# Patient Record
Sex: MALE | Race: WHITE | NOT HISPANIC OR LATINO | Employment: FULL TIME | RURAL
[De-identification: names, ages, dates, MRNs, and addresses within clinical notes are randomized per-mention and may not be internally consistent; named-entity substitution may affect disease eponyms.]

---

## 2020-02-14 ENCOUNTER — OFFICE VISIT (OUTPATIENT)
Dept: FAMILY MEDICINE CLINIC | Facility: CLINIC | Age: 25
End: 2020-02-14

## 2020-02-14 VITALS
DIASTOLIC BLOOD PRESSURE: 78 MMHG | HEIGHT: 69 IN | BODY MASS INDEX: 24.68 KG/M2 | TEMPERATURE: 99.5 F | HEART RATE: 105 BPM | WEIGHT: 166.6 LBS | SYSTOLIC BLOOD PRESSURE: 138 MMHG | RESPIRATION RATE: 18 BRPM | OXYGEN SATURATION: 98 %

## 2020-02-14 DIAGNOSIS — J01.00 ACUTE NON-RECURRENT MAXILLARY SINUSITIS: Primary | ICD-10-CM

## 2020-02-14 DIAGNOSIS — F17.200 TOBACCO USE DISORDER: ICD-10-CM

## 2020-02-14 PROBLEM — J30.9 ALLERGIC RHINITIS: Status: ACTIVE | Noted: 2020-02-14

## 2020-02-14 PROCEDURE — 99213 OFFICE O/P EST LOW 20 MIN: CPT | Performed by: FAMILY MEDICINE

## 2020-02-14 RX ORDER — AMOXICILLIN 500 MG/1
1000 TABLET, FILM COATED ORAL 3 TIMES DAILY
Qty: 60 TABLET | Refills: 0 | Status: SHIPPED | OUTPATIENT
Start: 2020-02-14 | End: 2020-09-04

## 2020-02-14 NOTE — PROGRESS NOTES
Jayjay   Hermilo Choi is a 24 y.o. male.     Chief Complaint   Patient presents with   • URI       URI    This is a new problem. The current episode started in the past 7 days. The problem has been gradually worsening. There has been no fever. Associated symptoms include congestion, coughing, ear pain, rhinorrhea and a sore throat. Pertinent negatives include no abdominal pain, chest pain, diarrhea, headaches, nausea, rash, sinus pain, vomiting or wheezing. He has tried acetaminophen and decongestant for the symptoms. The treatment provided no relief.        The following portions of the patient's history were reviewed and updated as appropriate: allergies, current medications, past family history, past medical history, past social history, past surgical history and problem list.    Allergies:  No Known Allergies    Social History:  Social History     Socioeconomic History   • Marital status: Single     Spouse name: Not on file   • Number of children: Not on file   • Years of education: Not on file   • Highest education level: Not on file   Tobacco Use   • Smoking status: Current Every Day Smoker     Years: 8.00     Types: Cigars     Start date: 2012   • Smokeless tobacco: Current User     Types: Chew   Substance and Sexual Activity   • Alcohol use: Yes     Frequency: 2-3 times a week     Drinks per session: 5 or 6     Binge frequency: Less than monthly     Comment: Occasional alcohol use, Social   • Drug use: Not Currently   • Sexual activity: Defer       Family History:  Family History   Problem Relation Age of Onset   • Thyroid disease Sister    • Heart disease Maternal Grandfather         Cardiocvascular       Past Medical History :  Patient Active Problem List   Diagnosis   • Allergic rhinitis   • Tobacco use disorder       Medication List:  Outpatient Encounter Medications as of 2/14/2020   Medication Sig Dispense Refill   • amoxicillin (AMOXIL) 500 MG tablet Take 2 tablets by mouth 3 (Three) Times a Day.  "60 tablet 0     No facility-administered encounter medications on file as of 2/14/2020.        Past Surgical History:  Past Surgical History:   Procedure Laterality Date   • TONSILLECTOMY AND ADENOIDECTOMY  2001   • TYMPANOSTOMY TUBE PLACEMENT  2003       Review of Systems:  Review of Systems   Constitutional: Negative for fever. Fatigue: no feeling well.   HENT: Positive for congestion, ear pain, rhinorrhea and sore throat.    Respiratory: Positive for cough. Negative for shortness of breath and wheezing.    Cardiovascular: Negative for chest pain.   Gastrointestinal: Negative for abdominal pain, diarrhea, nausea and vomiting.   Endocrine: Negative for cold intolerance, heat intolerance, polydipsia and polyuria.   Musculoskeletal: Negative for arthralgias and myalgias.   Skin: Negative for rash.   Neurological: Negative for weakness, numbness and headache.   Hematological: Negative for adenopathy. Does not bruise/bleed easily.       I have reviewed and confirmed the accuracy of the ROS as documented by the MA/LPN/RN Louise Ervin MD    Vital Signs:  Visit Vitals  /78 (BP Location: Left arm, Patient Position: Sitting, Cuff Size: Adult)   Pulse 105   Temp 99.5 °F (37.5 °C) (Oral)   Resp 18   Ht 175.3 cm (69\")   Wt 75.6 kg (166 lb 9.6 oz)   SpO2 98% Comment: Room air   BMI 24.60 kg/m²       Physical Exam   Constitutional: He is oriented to person, place, and time. He appears well-developed and well-nourished. No distress.   HENT:   Right Ear: Tympanic membrane and external ear normal.   Left Ear: Tympanic membrane and external ear normal.   Nose: Right sinus exhibits maxillary sinus tenderness.   Mouth/Throat: No oropharyngeal exudate (moderate post nasal secretions. ).   Eyes: Conjunctivae are normal.   Neck: Neck supple. No thyromegaly present.   Cardiovascular: Normal rate, regular rhythm, normal heart sounds and intact distal pulses. Exam reveals no gallop and no friction rub.   No murmur " heard.  Pulmonary/Chest: Effort normal and breath sounds normal. No respiratory distress. He has no wheezes. He has no rales.   Musculoskeletal: He exhibits no edema.   Lymphadenopathy:     He has no cervical adenopathy.   Neurological: He is alert and oriented to person, place, and time. Coordination normal.   Skin: Skin is warm. No rash noted. No erythema.       Assessment and Plan:  Problem List Items Addressed This Visit        Unprioritized    Tobacco use disorder    Overview     Cessation strongly encouraged techniques to stop were discussed. He is presently not interested.            Other Visit Diagnoses     Acute non-recurrent maxillary sinusitis    -  Primary    Fluids humidity, saline nose drops, antibiotics and follow up should sxs not improve over 48 hrs and resolve in1 week    Relevant Medications    amoxicillin (AMOXIL) 500 MG tablet          An After Visit Summary and PPPS were given to the patient.

## 2020-04-22 ENCOUNTER — TELEPHONE (OUTPATIENT)
Dept: FAMILY MEDICINE CLINIC | Facility: CLINIC | Age: 25
End: 2020-04-22

## 2020-04-22 NOTE — TELEPHONE ENCOUNTER
Called Vashti , Atom needs to be evaluated, can do video, phone or office visit,office visit was arranged per her request.

## 2020-04-22 NOTE — TELEPHONE ENCOUNTER
Pt's mom Vashti called and said he needs his poison ivy cream and a steroid pack. Pharmacy is [facility] in [place]. She can be reached at [phone]

## 2020-04-23 ENCOUNTER — TELEPHONE (OUTPATIENT)
Dept: FAMILY MEDICINE CLINIC | Facility: CLINIC | Age: 25
End: 2020-04-23

## 2020-09-04 ENCOUNTER — OFFICE VISIT (OUTPATIENT)
Dept: FAMILY MEDICINE CLINIC | Facility: CLINIC | Age: 25
End: 2020-09-04

## 2020-09-04 VITALS
TEMPERATURE: 99.8 F | HEART RATE: 95 BPM | HEIGHT: 69 IN | OXYGEN SATURATION: 98 % | RESPIRATION RATE: 18 BRPM | WEIGHT: 158.8 LBS | DIASTOLIC BLOOD PRESSURE: 70 MMHG | SYSTOLIC BLOOD PRESSURE: 128 MMHG | BODY MASS INDEX: 23.52 KG/M2

## 2020-09-04 DIAGNOSIS — R11.2 NAUSEA AND VOMITING, INTRACTABILITY OF VOMITING NOT SPECIFIED, UNSPECIFIED VOMITING TYPE: Primary | ICD-10-CM

## 2020-09-04 PROCEDURE — 99212 OFFICE O/P EST SF 10 MIN: CPT | Performed by: FAMILY MEDICINE

## 2020-09-04 NOTE — PROGRESS NOTES
Subjective   Hermilo Choi is a 25 y.o. male.     Chief Complaint   Patient presents with   • Nausea       Nausea   This is a new problem. The current episode started yesterday. The problem has been resolved. Associated symptoms include fatigue ( no feeling well. ), nausea and vertigo. Pertinent negatives include no abdominal pain, arthralgias, chest pain, chills, congestion, coughing, fever, headaches, myalgias, numbness, rash, sore throat, vomiting or weakness. Nothing aggravates the symptoms. He has tried nothing for the symptoms. The treatment provided significant relief.        The following portions of the patient's history were reviewed and updated as appropriate: allergies, current medications, past family history, past medical history, past social history, past surgical history and problem list.    Allergies:  No Known Allergies    Social History:  Social History     Socioeconomic History   • Marital status: Single     Spouse name: Not on file   • Number of children: Not on file   • Years of education: Not on file   • Highest education level: Not on file   Tobacco Use   • Smoking status: Current Every Day Smoker     Packs/day: 1.00     Years: 8.00     Pack years: 8.00     Types: Cigars     Start date: 2012   • Smokeless tobacco: Current User     Types: Chew   Substance and Sexual Activity   • Alcohol use: Yes     Frequency: 2-4 times a month     Drinks per session: 1 or 2     Binge frequency: Monthly     Comment: Occasional alcohol use, Social   • Drug use: Not Currently   • Sexual activity: Defer       Family History:  Family History   Problem Relation Age of Onset   • Thyroid disease Sister    • Heart disease Maternal Grandfather        Past Medical History :  Patient Active Problem List   Diagnosis   • Allergic rhinitis   • Tobacco use disorder       Medication List:  Outpatient Encounter Medications as of 9/4/2020   Medication Sig Dispense Refill   • [DISCONTINUED] amoxicillin (AMOXIL) 500 MG tablet Take  "2 tablets by mouth 3 (Three) Times a Day. 60 tablet 0     No facility-administered encounter medications on file as of 9/4/2020.        Past Surgical History:  Past Surgical History:   Procedure Laterality Date   • TONSILLECTOMY AND ADENOIDECTOMY  2001   • TYMPANOSTOMY TUBE PLACEMENT  2003       Review of Systems:  Review of Systems   Constitutional: Positive for fatigue ( no feeling well. ). Negative for chills and fever.   HENT: Negative for congestion and sore throat.    Respiratory: Negative for cough.    Cardiovascular: Negative for chest pain.   Gastrointestinal: Positive for nausea. Negative for abdominal pain and vomiting.   Endocrine: Negative for cold intolerance, heat intolerance, polydipsia and polyuria.   Genitourinary: Negative for dysuria and urgency.   Musculoskeletal: Negative for arthralgias and myalgias.   Skin: Negative for rash.   Neurological: Positive for vertigo. Negative for weakness, numbness and headache.       I have reviewed and confirmed the accuracy of the HPI and ROS as documented by the MA/LPN/RN Louise Ervin MD    Vital Signs:  Visit Vitals  /70 (BP Location: Left arm, Patient Position: Sitting, Cuff Size: Adult)   Pulse 95   Temp 99.8 °F (37.7 °C)   Resp 18   Ht 175.3 cm (69\")   Wt 72 kg (158 lb 12.8 oz)   SpO2 98% Comment: room air   BMI 23.45 kg/m²       Physical Exam   Constitutional: He is oriented to person, place, and time. He appears well-developed and well-nourished. No distress.   HENT:   Right Ear: Tympanic membrane and external ear normal.   Left Ear: Tympanic membrane and external ear normal.   Mouth/Throat: Oropharynx is clear and moist.   Eyes: Conjunctivae are normal.   Neck: Neck supple. No JVD present. No thyromegaly present.   Cardiovascular: Normal rate, regular rhythm and normal heart sounds. Exam reveals no gallop and no friction rub.   No murmur heard.  Pulmonary/Chest: Effort normal and breath sounds normal. No respiratory distress. He has no " wheezes. He has no rales.   Abdominal: Soft. Bowel sounds are normal. He exhibits no distension and no mass. There is no abdominal tenderness.   Lymphadenopathy:     He has no cervical adenopathy.   Neurological: He is alert and oriented to person, place, and time. He displays normal reflexes. Coordination normal.   Skin: Skin is warm. No rash noted. No erythema.   Vitals reviewed.      Assessment and Plan:  Problem List Items Addressed This Visit     None      Visit Diagnoses     Nausea and vomiting, intractability of vomiting not specified, unspecified vomiting type    -  Primary    Etiology uncertain, possibly heat related. Note for work. He will push fluids rest and notify should sxs continue lab and further workup.           An After Visit Summary and PPPS were given to the patient.

## 2021-09-21 ENCOUNTER — TELEPHONE (OUTPATIENT)
Dept: FAMILY MEDICINE CLINIC | Facility: CLINIC | Age: 26
End: 2021-09-21

## 2022-10-28 ENCOUNTER — OFFICE VISIT (OUTPATIENT)
Dept: FAMILY MEDICINE CLINIC | Facility: CLINIC | Age: 27
End: 2022-10-28

## 2022-10-28 VITALS
DIASTOLIC BLOOD PRESSURE: 86 MMHG | OXYGEN SATURATION: 98 % | TEMPERATURE: 98.1 F | HEART RATE: 80 BPM | RESPIRATION RATE: 18 BRPM | WEIGHT: 168.8 LBS | HEIGHT: 70 IN | SYSTOLIC BLOOD PRESSURE: 120 MMHG | BODY MASS INDEX: 24.17 KG/M2

## 2022-10-28 DIAGNOSIS — F17.200 TOBACCO USE DISORDER: ICD-10-CM

## 2022-10-28 DIAGNOSIS — F41.9 ANXIETY: Primary | ICD-10-CM

## 2022-10-28 PROCEDURE — 99213 OFFICE O/P EST LOW 20 MIN: CPT | Performed by: FAMILY MEDICINE

## 2022-10-28 RX ORDER — ESCITALOPRAM OXALATE 10 MG/1
10 TABLET ORAL DAILY
Qty: 90 TABLET | Refills: 3 | Status: SHIPPED | OUTPATIENT
Start: 2022-10-28

## 2022-10-28 NOTE — PROGRESS NOTES
"Chief Complaint  Anxiety    Subjective     CC  Problem List  Visit Diagnosis   Encounters  Notes  Medications  Labs  Result Review Imaging  Media    Hermilo Choi presents to [facility] for   Anxiety  Presents for initial visit. Onset was 1 to 5 years ago. The problem has been gradually worsening. Symptoms include decreased concentration, irritability, nervous/anxious behavior, panic and restlessness. Patient reports no chest pain, depressed mood, excessive worry, nausea, palpitations, shortness of breath or suicidal ideas. The severity of symptoms is moderate. Exacerbated by: financial stress. The quality of sleep is fair. Nighttime awakenings: one to two.           Review of Systems   Constitutional: Positive for irritability.   Respiratory: Negative for shortness of breath.    Cardiovascular: Negative for chest pain and palpitations.   Gastrointestinal: Negative for abdominal pain, constipation, diarrhea and nausea.   Psychiatric/Behavioral: Positive for decreased concentration. Negative for suicidal ideas and depressed mood. The patient is nervous/anxious.         Objective   Vital Signs:   /86 (BP Location: Right arm, Patient Position: Sitting, Cuff Size: Adult)   Pulse 80   Temp 98.1 °F (36.7 °C) (Temporal)   Resp 18   Ht 177.8 cm (70\")   Wt 76.6 kg (168 lb 12.8 oz)   SpO2 98% Comment: Room air  BMI 24.22 kg/m²     Physical Exam  Constitutional:       General: He is not in acute distress.  Cardiovascular:      Rate and Rhythm: Normal rate.      Heart sounds: No murmur heard.  Pulmonary:      Effort: Pulmonary effort is normal.      Breath sounds: Normal breath sounds. No wheezing.   Musculoskeletal:      Cervical back: Neck supple.   Lymphadenopathy:      Cervical: No cervical adenopathy.   Neurological:      Mental Status: He is alert.        Result Review :Labs  Result Review  Imaging  Med Tab  Media                 Assessment and Plan CC Problem " List  Visit Diagnosis  ROS  Review (Popup)  Health Maintenance  Quality  BestPractice  Medications  SmartSets  SnapShot Encounters  Media  Problem List Items Addressed This Visit        Unprioritized    Tobacco use disorder    Overview     Cessation strongly encouraged techniques to stop were discussed. He is presently not interested         Anxiety - Primary    Overview     Moderate begin meds and follow up in 1 mo.  Good support network.   F/u in 1 mo prn.          Relevant Medications    escitalopram (LEXAPRO) 10 MG tablet       Follow Up Instructions Charge Capture  Follow-up Communications  Return in about 4 weeks (around 11/25/2022).  Patient was given instructions and counseling regarding his condition or for health maintenance advice. Please see specific information pulled into the AVS if appropriate.

## 2023-07-07 PROBLEM — E66.3 OVERWEIGHT (BMI 25.0-29.9): Status: ACTIVE | Noted: 2023-07-07

## 2023-07-07 PROBLEM — Z00.00 ENCOUNTER FOR ANNUAL PHYSICAL EXAM: Status: ACTIVE | Noted: 2023-07-07

## 2023-08-15 ENCOUNTER — TELEPHONE (OUTPATIENT)
Dept: FAMILY MEDICINE CLINIC | Facility: CLINIC | Age: 28
End: 2023-08-15
Payer: COMMERCIAL

## 2023-08-15 ENCOUNTER — OFFICE VISIT (OUTPATIENT)
Dept: FAMILY MEDICINE CLINIC | Facility: CLINIC | Age: 28
End: 2023-08-15
Payer: COMMERCIAL

## 2023-08-15 VITALS
OXYGEN SATURATION: 98 % | RESPIRATION RATE: 16 BRPM | SYSTOLIC BLOOD PRESSURE: 114 MMHG | HEART RATE: 75 BPM | WEIGHT: 188.4 LBS | TEMPERATURE: 97.8 F | HEIGHT: 70 IN | DIASTOLIC BLOOD PRESSURE: 60 MMHG | BODY MASS INDEX: 26.97 KG/M2

## 2023-08-15 DIAGNOSIS — E66.3 OVERWEIGHT (BMI 25.0-29.9): ICD-10-CM

## 2023-08-15 DIAGNOSIS — K64.4 EXTERNAL HEMORRHOID: ICD-10-CM

## 2023-08-15 DIAGNOSIS — K92.2 LOWER GI BLEED: Primary | ICD-10-CM

## 2023-08-15 PROCEDURE — 99213 OFFICE O/P EST LOW 20 MIN: CPT | Performed by: STUDENT IN AN ORGANIZED HEALTH CARE EDUCATION/TRAINING PROGRAM

## 2023-08-15 RX ORDER — HYDROCORTISONE ACETATE 25 MG/1
25 SUPPOSITORY RECTAL 2 TIMES DAILY
Qty: 14 SUPPOSITORY | Refills: 0 | Status: SHIPPED | OUTPATIENT
Start: 2023-08-15 | End: 2023-08-22

## 2023-08-15 NOTE — PROGRESS NOTES
Subjective   Atom EFRA Choi is a 28 y.o. male.   Chief Complaint   Patient presents with    Rectal Bleeding       History of Present Illness       Bright red blood per rectum  - Started 2 years ago-waxing and waning.  Worsened 08/10/2023  - Bright red blood in stool, water, and toilet paper. Some pain on anus  - LUQ pain (dull ache swelling sensation) occasionally  - Pt states he has been light headed, dizzy  - Pt has never had a colonoscopy  -  When he actually has a bowel movement, there is no blood, later he will sit down thinking that he has to have a bowel movement and there will be no stool but only blood  - has bm 2-3x a day, states they are smooth and formed.  States when he has had constipation it was only once in the past  -Patient states that he will sit on the toilet and browse his phone for 10 minutes every time he goes to the restroom  -Patient brought photo on his phone of his toilet.  Toilet bowl is bright red with blood and flecks of blood are sprayed higher inside the toilet bowl almost all the way up to the seat    The following portions of the patient's history were reviewed and updated as appropriate: allergies, current medications, past family history, past medical history, past social history, past surgical history, and problem list.    Patient Active Problem List   Diagnosis    Allergic rhinitis    Tobacco use disorder    Anxiety    Encounter for annual physical exam    Overweight (BMI 25.0-29.9)       Current Outpatient Medications on File Prior to Visit   Medication Sig Dispense Refill    escitalopram (LEXAPRO) 10 MG tablet Take 1 tablet by mouth Daily. 90 tablet 3     No current facility-administered medications on file prior to visit.     Current outpatient and discharge medications have been reconciled for the patient.  Reviewed by: Delaney Fall DO      No Known Allergies      Objective   Visit Vitals  /60 (BP Location: Right arm, Patient Position: Sitting, Cuff Size: Adult)  "  Pulse 75   Temp 97.8 øF (36.6 øC) (Skin)   Resp 16   Ht 177.8 cm (70\")   Wt 85.5 kg (188 lb 6.4 oz)   SpO2 98%   BMI 27.03 kg/mý       Physical Exam  HENT:      Head: Normocephalic and atraumatic.   Eyes:      Conjunctiva/sclera: Conjunctivae normal.   Genitourinary:     Comments: - No anal sphincter tears apparent via exam  -No external hemorrhoids on anus or surrounding anus  -Attempted to look via anoscope but limited due to patient's discomfort.  Unable to completely visualize hemorrhoid but able to see abnormal mucosa discoloration on patient's 7 to 8 o'clock position on the anus  Neurological:      General: No focal deficit present.      Mental Status: He is alert and oriented to person, place, and time.   Psychiatric:         Mood and Affect: Mood normal.         Behavior: Behavior normal.         Diagnoses and all orders for this visit:    1. Lower GI bleed (Primary)/external hemorrhoid  -Discussed with patient that due to his symptoms, I would suspect he may have an external hemorrhoid (especially since patient did not have an anal tear on physical exam) considering this is very painful for him, he is getting a lot of bleeding and distance of exquisite pain for patient was about half an inch inside the anus via anoscope     - CBC & Differential, melony profile, ferritin to evaluate for anemia due to bleeding and symptoms of dizziness  -  hydrocortisone (ANUSOL-HC) 25 MG suppository; Insert 1 suppository into the rectum 2 (Two) Times a Day for 7 days.  Dispense: 14 suppository; Refill: 0 to help shrink and give some relief of hemorrhoids  -Gave patient some information by after visit summary on constipation hemorrhoids though patient does not sound like he is constipated and the hemorrhoid is not external enough to where he may be able to apply cream easily.  Did tell patient to not sit on the toilet for long periods of time as that can cause increased pressure in that area  -     Ambulatory Referral to " Gastroenterology: [facility] for further investigation if the above does not resolve his issue      3. Overweight (BMI 25.0-29.9)  Assessment & Plan:  Patient's (Body mass index is 27.03 kg/mý.) indicates that they are overweight with health conditions that include none . Weight is unchanged. BMI is is above average; BMI management plan is completed. We discussed portion control and increasing exercise.                Follow Up  -As needed    Expected course, medications, and adverse effects discussed as appropriate.  Call or return if worsening or persistent symptoms.  I wore protective equipment throughout this patient encounter to include mask and eye protection. Hand hygiene was performed before donning protective equipment and after removal when leaving the room.    This document is intended for medical expert use only. Reading of this document by patients and/or patient's family without participating medical staff guidance may result in misinterpretation and unintended morbidity. Any interpretation of such data is the responsibility of the patient and/or family member responsible for the patient in concert with their primary or specialist providers, not to be left for sources of online searches such as [employer], [employer] or similar queries. Relying on these approaches to knowledge may result in misinterpretation, misguided goals of care and even death should patients or family members try recommendations outside of the realm of professional medical care.

## 2023-08-15 NOTE — PATIENT INSTRUCTIONS
Drink 2.7L of fluid daily  Add 5g of fiber to your diet and slowly increase up to 10g  Start over the counter probiotics  Use miralax daily for 10 days (can use more than on back of the box, increase slowly until you get the right consistency and frequency of bowel movement) to get you started

## 2023-08-15 NOTE — TELEPHONE ENCOUNTER
----- Message from Delaney Fall DO sent at 8/15/2023  9:22 AM EDT -----  Regarding: Steroid suppositories  Let patient know that I sent him in some steroid suppositories for a week to help shrink hemorrhoid if present.  Only a week is considered safe so that we do not start thinning the tissue in the rectum.  It will not resolve or fix the hemorrhoid but will hopefully help shrink it and provide some relief

## 2023-08-15 NOTE — TELEPHONE ENCOUNTER
Spoke to pt's mother (confirmed by NORA) 08/15/2023, 3:25 pm.  Advised her per Dr. Fall medication has been sent in and instructions

## 2023-08-15 NOTE — ASSESSMENT & PLAN NOTE
Patient's (Body mass index is 27.03 kg/mý.) indicates that they are overweight with health conditions that include none . Weight is unchanged. BMI is is above average; BMI management plan is completed. We discussed portion control and increasing exercise.

## 2023-08-17 ENCOUNTER — TELEPHONE (OUTPATIENT)
Dept: FAMILY MEDICINE CLINIC | Facility: CLINIC | Age: 28
End: 2023-08-17

## 2023-08-17 NOTE — TELEPHONE ENCOUNTER
Caller: BRANDT TRUJILLO    Relationship: Mother    Best call back number: [phone]     Do you know the name of the person who called: MARCY    What was the call regarding: MOTHER WANTED TO INFORM MARCY THAT ATOM WILL NOT GO TO EMERGENCY ROOM. PLEASE CALL MOTHER TO DISCUSS

## 2023-08-17 NOTE — TELEPHONE ENCOUNTER
Pt's mother left a message stating pt is not doing good.    Called pt's mother (confirmed by NORA), FRANK 08/17/2023, 09:15 am advised her per Dr. Fall pt will need to go to ER as soon as possible    Pt's mother called back 08/17/2023, 09:45 am stating pt is not wanting to go to ER, would rather wait to go to GI.  I explained to her pt needs to go to the ER as he may have a GI bleed (mother states pt will get bloated, have bloody bowel movement)    Advised her we may not be able to get pt in to see GI for maybe up to 2 to 3 weeks from now.    Encouraged her to have him go to the ER, she is to call me back if her refuses.

## 2023-08-17 NOTE — TELEPHONE ENCOUNTER
Spoke to pt 08/17/2023, 10:24 am asked pt if he was going to go to ER he states states he is at work will go after, cannot leave his job.  Advised pt he could be having a GI bleed, pt refuses to go, will go after work

## 2023-08-17 NOTE — TELEPHONE ENCOUNTER
Caller: BRANDT TRUJILLO    Relationship to patient: Mother    Best call back number: [phone]    Chief complaint: HEMORID, BLEEDING     Patient directed to call 911 or go to their nearest emergency room.     Patient verbalized understanding: [x] Yes  [] No  If no, why?    Additional notes: PATIENT'S MOTHER CALLED AND STATED THAT THE PATIENT WILL GO TO THE ER TONIGHT AFTER HE GETS OFF OF WORK.

## 2023-10-26 ENCOUNTER — OFFICE VISIT (OUTPATIENT)
Dept: FAMILY MEDICINE CLINIC | Facility: CLINIC | Age: 28
End: 2023-10-26
Payer: COMMERCIAL

## 2023-10-26 VITALS
WEIGHT: 193 LBS | TEMPERATURE: 97.1 F | BODY MASS INDEX: 27.63 KG/M2 | HEIGHT: 70 IN | OXYGEN SATURATION: 99 % | DIASTOLIC BLOOD PRESSURE: 74 MMHG | HEART RATE: 104 BPM | SYSTOLIC BLOOD PRESSURE: 112 MMHG | RESPIRATION RATE: 18 BRPM

## 2023-10-26 DIAGNOSIS — R09.81 SINUS CONGESTION: ICD-10-CM

## 2023-10-26 DIAGNOSIS — J30.89 SEASONAL ALLERGIC RHINITIS DUE TO OTHER ALLERGIC TRIGGER: Primary | ICD-10-CM

## 2023-10-26 DIAGNOSIS — F17.200 TOBACCO USE DISORDER: ICD-10-CM

## 2023-10-26 LAB
EXPIRATION DATE: NORMAL
FLUAV AG UPPER RESP QL IA.RAPID: NOT DETECTED
FLUBV AG UPPER RESP QL IA.RAPID: NOT DETECTED
INTERNAL CONTROL: NORMAL
Lab: NORMAL
SARS-COV-2 AG UPPER RESP QL IA.RAPID: NOT DETECTED

## 2023-10-26 PROCEDURE — 99213 OFFICE O/P EST LOW 20 MIN: CPT | Performed by: FAMILY MEDICINE

## 2023-10-26 PROCEDURE — 87428 SARSCOV & INF VIR A&B AG IA: CPT | Performed by: FAMILY MEDICINE

## 2023-10-26 RX ORDER — AMOXICILLIN 500 MG/1
1000 CAPSULE ORAL 2 TIMES DAILY
Qty: 28 CAPSULE | Refills: 0 | Status: SHIPPED | OUTPATIENT
Start: 2023-10-26 | End: 2023-11-02

## 2023-10-26 NOTE — LETTER
October 26, 2023     Patient: Hermilo Choi   YOB: 1995   Date of Visit: 10/26/2023       To Whom It May Concern:    Please excuse Hermilo Choi from work 10/26/23 and 10/27/23.         Sincerely,        Tabatha Varma MD

## 2023-10-26 NOTE — PROGRESS NOTES
Chief Complaint   Patient presents with    URI       Subjective   Atom EFRA Choi is a 28 y.o. male.     URI   This is a new problem. The current episode started in the past 7 days. The problem has been gradually worsening. There has been no fever. Associated symptoms include congestion, coughing, headaches, nausea and vomiting. Pertinent negatives include no abdominal pain, chest pain, diarrhea, dysuria, ear pain, plugged ear sensation, rhinorrhea, sinus pain, sneezing, sore throat or wheezing. Treatments tried: claritin.        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  Work environment worsens symptoms.  He reports new allergens at work.    Past Medical History :  Active Ambulatory Problems     Diagnosis Date Noted    Allergic rhinitis 02/14/2020    Tobacco use disorder 02/14/2020    Anxiety 10/28/2022    Encounter for annual physical exam 07/07/2023    Overweight (BMI 25.0-29.9) 07/07/2023     Resolved Ambulatory Problems     Diagnosis Date Noted    No Resolved Ambulatory Problems     No Additional Past Medical History       Medication List:    Current Outpatient Medications:     escitalopram (LEXAPRO) 10 MG tablet, Take 1 tablet by mouth Daily., Disp: 90 tablet, Rfl: 3    No Known Allergies    Social History     Tobacco Use    Smoking status: Every Day     Packs/day: 1.00     Years: 10.00     Additional pack years: 0.00     Total pack years: 10.00     Types: Cigars, Cigarettes     Start date: 2012     Passive exposure: Past    Smokeless tobacco: Former     Types: Chew     Quit date: 9/1/2022   Substance Use Topics    Alcohol use: Yes     Comment: Occasional alcohol use, Social         Review of Systems   Constitutional:  Negative for fever.   HENT:  Positive for congestion and sinus pressure. Negative for ear discharge, ear pain, facial swelling, rhinorrhea, sneezing, sore throat and trouble swallowing.    Respiratory:  Positive for cough. Negative for shortness of breath and wheezing.   "  Cardiovascular:  Negative for chest pain.   Gastrointestinal:  Positive for nausea and vomiting. Negative for abdominal pain and diarrhea.   Genitourinary:  Negative for dysuria.   Allergic/Immunologic: Positive for environmental allergies.         Objective   Vitals:    10/26/23 1139   BP: 112/74   Pulse: 104   Resp: 18   Temp: 97.1 °F (36.2 °C)   TempSrc: Temporal   SpO2: 99%   Weight: 87.5 kg (193 lb)   Height: 177.8 cm (70\")     Body mass index is 27.69 kg/m².    Physical Exam  Constitutional:       Appearance: Normal appearance. He is not ill-appearing.   HENT:      Head: Normocephalic and atraumatic.      Right Ear: Ear canal and external ear normal. Tympanic membrane is not erythematous.      Left Ear: Ear canal and external ear normal. Tympanic membrane is not erythematous.      Nose: Congestion present.      Mouth/Throat:      Mouth: Mucous membranes are moist.      Pharynx: No oropharyngeal exudate.   Eyes:      General:         Right eye: No discharge.         Left eye: No discharge.      Conjunctiva/sclera: Conjunctivae normal.   Cardiovascular:      Rate and Rhythm: Normal rate and regular rhythm.   Pulmonary:      Breath sounds: Normal breath sounds. No wheezing, rhonchi or rales.   Skin:     Findings: No rash.   Neurological:      Mental Status: He is alert. Mental status is at baseline.   Psychiatric:         Mood and Affect: Mood normal.       Recent Results    POCT SARS-CoV-2 + Flu Antigen JOEL    Collection Time: 10/26/23 11:58 AM    Specimen: Swab   Result Value Ref Range    SARS Antigen Not Detected Not Detected, Presumptive Negative    Influenza A Antigen JOEL Not Detected Not Detected    Influenza B Antigen JOEL Not Detected Not Detected         Assessment & Plan     Diagnoses and all orders for this visit:    1. Seasonal allergic rhinitis due to other allergic trigger (Primary)    2. Sinus congestion  -     POCT SARS-CoV-2 + Flu Antigen JOEL  -     amoxicillin (AMOXIL) 500 MG capsule; Take 2 " capsules by mouth 2 (Two) Times a Day for 7 days.  Dispense: 28 capsule; Refill: 0    3. Tobacco use disorder      Exacerbation of seasonal allergies not controlled with Claritin.  Recommended switching to Zyrtec-D as long as he could tolerate the decongestant.  Resume nasal steroid spray.    If symptoms worsen through the weekend (fever, worsening sinus pressure, purulent nasal discharge), he can fill amoxicillin script (printed script provided to patient).      F/U PRN.  No follow-ups on file.       Patient was given instructions and counseling regarding his/her condition or for health maintenance advice. Please see specific information pulled into the AVS if appropriate.

## 2023-11-01 ENCOUNTER — OFFICE VISIT (OUTPATIENT)
Dept: FAMILY MEDICINE CLINIC | Facility: CLINIC | Age: 28
End: 2023-11-01
Payer: COMMERCIAL

## 2023-11-01 VITALS
BODY MASS INDEX: 27.8 KG/M2 | OXYGEN SATURATION: 99 % | HEART RATE: 99 BPM | SYSTOLIC BLOOD PRESSURE: 116 MMHG | TEMPERATURE: 97.3 F | DIASTOLIC BLOOD PRESSURE: 78 MMHG | RESPIRATION RATE: 20 BRPM | HEIGHT: 70 IN | WEIGHT: 194.2 LBS

## 2023-11-01 DIAGNOSIS — K64.8 INTERNAL HEMORRHOIDS: ICD-10-CM

## 2023-11-01 DIAGNOSIS — K62.5 RECTAL BLEEDING: Primary | ICD-10-CM

## 2023-11-01 PROCEDURE — 99213 OFFICE O/P EST LOW 20 MIN: CPT | Performed by: FAMILY MEDICINE

## 2023-11-01 NOTE — LETTER
November 1, 2023     Patient: Hermilo Choi   YOB: 1995   Date of Visit: 11/1/2023       To Whom It May Concern:    It is my medical opinion that Hermilo Choi was unable to work today due to medical problems.             Sincerely,        Louise Ervin MD    CC: No Recipients

## 2023-11-01 NOTE — PROGRESS NOTES
"Chief Complaint  Hemorrhoids    Subjective     CC  Problem List  Visit Diagnosis   Encounters  Notes  Medications  Labs  Result Review Imaging  Media    Hermilo Choi presents to [facility] for   History of Present Illness  Hermilo is here today for hemorrhoids, he feels as if one has busted. States there is more and more blood present with stools. Has seen gastro they recommended he up his fiber intake for easier stools.   Hemorrhoids  This is a new problem. The current episode started more than 1 year ago. The problem occurs constantly. The problem has been gradually worsening. Pertinent negatives include no abdominal pain, change in bowel habit, chest pain, fever, nausea or vomiting. The symptoms are aggravated by eating. Treatments tried: Fiber for easier stools.     Review of Systems   Constitutional:  Negative for appetite change, fever and unexpected weight loss.   Respiratory:  Negative for shortness of breath and wheezing.    Cardiovascular:  Negative for chest pain and palpitations.   Gastrointestinal:  Positive for blood in stool, constipation and hemorrhoids. Negative for abdominal pain, change in bowel habit, diarrhea, nausea and vomiting.   Genitourinary:  Negative for dysuria.   Hematological:  Negative for adenopathy. Does not bruise/bleed easily.        Objective   Vital Signs:   /78 (BP Location: Left arm, Patient Position: Sitting, Cuff Size: Adult)   Pulse 99   Temp 97.3 °F (36.3 °C) (Infrared)   Resp 20   Ht 177.8 cm (70\")   Wt 88.1 kg (194 lb 3.2 oz)   SpO2 99% Comment: room air'  BMI 27.86 kg/m²     Physical Exam  Constitutional:       General: He is not in acute distress.  Cardiovascular:      Rate and Rhythm: Normal rate and regular rhythm.      Heart sounds: No murmur heard.  Pulmonary:      Effort: Pulmonary effort is normal.      Breath sounds: Normal breath sounds. No wheezing.   Abdominal:      General: Abdomen is flat. Bowel sounds " are normal. There is no distension.      Palpations: Abdomen is soft. There is no mass.      Tenderness: There is no abdominal tenderness.      Hernia: No hernia is present.   Genitourinary:     Rectum: Internal hemorrhoid present. No mass or tenderness.      Comments: There is cranberry stool present. Heme positive. The rectal mucosa appears normal to anoscopic exam  Musculoskeletal:      Cervical back: Neck supple.   Lymphadenopathy:      Cervical: No cervical adenopathy.   Neurological:      Mental Status: He is alert.        Result Review :Labs  Result Review  Imaging  Med Tab  Media                 Assessment and Plan CC Problem List  Visit Diagnosis  ROS  Review (Popup)  Health Maintenance  Quality  BestPractice  Medications  SmartSets  SnapShot Encounters  Media  Problem List Items Addressed This Visit    None  Visit Diagnoses       Rectal bleeding    -  Primary    neg.  exam, heme positive stool previous neg lab neg CT, was seen by Aiyana smith colonoscopy. He given packet [facility] colonoscopy importance understood.    Relevant Orders    CBC & Differential (Completed)    Comprehensive Metabolic Panel (Completed)    C-reactive protein (Completed)    Internal hemorrhoids        no evidence of active bleeding.            Follow Up Instructions Charge Capture  Follow-up Communications  Return if symptoms worsen or fail to improve.  Patient was given instructions and counseling regarding his condition or for health maintenance advice. Please see specific information pulled into the AVS if appropriate.

## 2023-11-02 LAB
ALBUMIN SERPL-MCNC: 4.7 G/DL (ref 4.3–5.2)
ALBUMIN/GLOB SERPL: 2 {RATIO} (ref 1.2–2.2)
ALP SERPL-CCNC: 103 IU/L (ref 44–121)
ALT SERPL-CCNC: 11 IU/L (ref 0–44)
AST SERPL-CCNC: 16 IU/L (ref 0–40)
BASOPHILS # BLD AUTO: 0.1 X10E3/UL (ref 0–0.2)
BASOPHILS NFR BLD AUTO: 1 %
BILIRUB SERPL-MCNC: 0.7 MG/DL (ref 0–1.2)
BUN SERPL-MCNC: 12 MG/DL (ref 6–20)
BUN/CREAT SERPL: 12 (ref 9–20)
CALCIUM SERPL-MCNC: 9.1 MG/DL (ref 8.7–10.2)
CHLORIDE SERPL-SCNC: 105 MMOL/L (ref 96–106)
CO2 SERPL-SCNC: 26 MMOL/L (ref 20–29)
CREAT SERPL-MCNC: 0.97 MG/DL (ref 0.76–1.27)
CRP SERPL-MCNC: 2 MG/L (ref 0–10)
EGFRCR SERPLBLD CKD-EPI 2021: 109 ML/MIN/1.73
EOSINOPHIL # BLD AUTO: 0.3 X10E3/UL (ref 0–0.4)
EOSINOPHIL NFR BLD AUTO: 4 %
ERYTHROCYTE [DISTWIDTH] IN BLOOD BY AUTOMATED COUNT: 12.6 % (ref 11.6–15.4)
GLOBULIN SER CALC-MCNC: 2.3 G/DL (ref 1.5–4.5)
GLUCOSE SERPL-MCNC: 102 MG/DL (ref 70–99)
HCT VFR BLD AUTO: 46.5 % (ref 37.5–51)
HGB BLD-MCNC: 15.3 G/DL (ref 13–17.7)
IMM GRANULOCYTES # BLD AUTO: 0 X10E3/UL (ref 0–0.1)
IMM GRANULOCYTES NFR BLD AUTO: 0 %
LYMPHOCYTES # BLD AUTO: 1.6 X10E3/UL (ref 0.7–3.1)
LYMPHOCYTES NFR BLD AUTO: 19 %
MCH RBC QN AUTO: 29.2 PG (ref 26.6–33)
MCHC RBC AUTO-ENTMCNC: 32.9 G/DL (ref 31.5–35.7)
MCV RBC AUTO: 89 FL (ref 79–97)
MONOCYTES # BLD AUTO: 0.6 X10E3/UL (ref 0.1–0.9)
MONOCYTES NFR BLD AUTO: 7 %
NEUTROPHILS # BLD AUTO: 5.8 X10E3/UL (ref 1.4–7)
NEUTROPHILS NFR BLD AUTO: 69 %
PLATELET # BLD AUTO: 344 X10E3/UL (ref 150–450)
POTASSIUM SERPL-SCNC: 4.4 MMOL/L (ref 3.5–5.2)
PROT SERPL-MCNC: 7 G/DL (ref 6–8.5)
RBC # BLD AUTO: 5.24 X10E6/UL (ref 4.14–5.8)
SODIUM SERPL-SCNC: 144 MMOL/L (ref 134–144)
WBC # BLD AUTO: 8.4 X10E3/UL (ref 3.4–10.8)

## 2023-11-02 NOTE — PROGRESS NOTES
Upmann's message sent   Good morning we have your labs back and per Dr. Ervin  You have a normal white blood cell count, normal platelet count ( this is the blood clotting factor) and no signs of anemia.   You have a normal liver, kidney and electrolyte function. Your glucose was 102. Your C-reactive protein was normal and does not show any signs of inflammation, it is unlikely this is crohn's or ulcerative colitis. Please be sure and get your colonoscopy done.

## 2023-12-04 ENCOUNTER — TELEPHONE (OUTPATIENT)
Dept: FAMILY MEDICINE CLINIC | Facility: CLINIC | Age: 28
End: 2023-12-04
Payer: COMMERCIAL

## 2023-12-04 NOTE — TELEPHONE ENCOUNTER
----- Message from Sabrina Chavarria MA sent at 11/1/2023  5:06 PM EDT -----  Regarding: Colonoscopy  Did he follow up and get his colonoscopy

## 2024-05-15 NOTE — PROGRESS NOTES
"Chief Complaint  Earache and URI    Subjective     CC  Problem List  Visit Diagnosis   Encounters  Notes  Medications  Labs  Result Review Imaging  Media    Hermilo Choi presents to [facility] for   URI   This is a new problem. The current episode started in the past 7 days. The problem has been unchanged. There has been no fever. Associated symptoms include congestion, coughing, ear pain, rhinorrhea and sinus pain. Pertinent negatives include no abdominal pain, chest pain, diarrhea, dysuria, headaches, joint pain, joint swelling, nausea, neck pain, plugged ear sensation, rash, sneezing, sore throat, swollen glands, vomiting or wheezing. He has tried nothing for the symptoms.     Review of Systems   HENT:  Positive for congestion, ear pain and rhinorrhea. Negative for sneezing, sore throat and swollen glands.    Respiratory:  Positive for cough. Negative for wheezing.    Cardiovascular:  Negative for chest pain.   Gastrointestinal:  Negative for abdominal pain, diarrhea, nausea and vomiting.   Genitourinary:  Negative for dysuria.   Musculoskeletal:  Negative for joint pain and neck pain.   Skin:  Negative for rash.        Objective   Vital Signs:   /74 (BP Location: Right arm, Patient Position: Sitting, Cuff Size: Adult)   Pulse 113   Temp 98.4 °F (36.9 °C) (Temporal)   Resp 18   Ht 182.9 cm (72\")   Wt 91.3 kg (201 lb 4 oz)   SpO2 98% Comment: room air  BMI 27.29 kg/m²     Physical Exam  Constitutional:       General: He is not in acute distress.  HENT:      Right Ear: External ear normal. Tympanic membrane is injected.      Left Ear: Tympanic membrane normal.      Nose:      Right Sinus: Maxillary sinus tenderness present.      Left Sinus: Maxillary sinus tenderness present.      Mouth/Throat:      Pharynx: No oropharyngeal exudate or posterior oropharyngeal erythema (moderate post nasal secretions.).   Cardiovascular:      Rate and Rhythm: Normal rate and " regular rhythm.      Heart sounds: No murmur heard.  Pulmonary:      Effort: Pulmonary effort is normal.      Breath sounds: Normal breath sounds. No wheezing.   Musculoskeletal:      Cervical back: Neck supple.      Right lower leg: No edema.      Left lower leg: No edema.   Lymphadenopathy:      Cervical: No cervical adenopathy.   Skin:     Findings: No rash.   Neurological:      Mental Status: He is alert.        Result Review :Labs  Result Review  Imaging  Med Tab  Media                 Assessment and Plan CC Problem List  Visit Diagnosis  ROS  Review (Popup)  Health Maintenance  Quality  BestPractice  Medications  SmartSets  SnapShot Encounters  Media  Problem List Items Addressed This Visit          Unprioritized    Allergic rhinitis - Primary    Overview     Exacerbation resume meds and follow.          Relevant Medications    montelukast (SINGULAIR) 10 MG tablet    cetirizine (zyrTEC) 10 MG tablet    Overweight (BMI 25.0-29.9)    Current Assessment & Plan     Patient's (Body mass index is 27.29 kg/m².) indicates that they are overweight with health conditions that include none . Weight is unchanged. BMI is is above average; BMI management plan is completed. We discussed portion control and increasing exercise.           Other Visit Diagnoses       Acute non-recurrent maxillary sinusitis        abx saline flushes, avoidance.    Relevant Medications    amoxicillin (AMOXIL) 500 MG tablet            Follow Up Instructions Charge Capture  Follow-up Communications  Return if symptoms worsen or fail to improve.  Patient was given instructions and counseling regarding his condition or for health maintenance advice. Please see specific information pulled into the AVS if appropriate.

## 2024-05-17 ENCOUNTER — OFFICE VISIT (OUTPATIENT)
Dept: FAMILY MEDICINE CLINIC | Facility: CLINIC | Age: 29
End: 2024-05-17
Payer: COMMERCIAL

## 2024-05-17 VITALS
OXYGEN SATURATION: 98 % | HEART RATE: 113 BPM | RESPIRATION RATE: 18 BRPM | TEMPERATURE: 98.4 F | HEIGHT: 72 IN | SYSTOLIC BLOOD PRESSURE: 124 MMHG | DIASTOLIC BLOOD PRESSURE: 74 MMHG | WEIGHT: 201.25 LBS | BODY MASS INDEX: 27.26 KG/M2

## 2024-05-17 DIAGNOSIS — J30.89 SEASONAL ALLERGIC RHINITIS DUE TO OTHER ALLERGIC TRIGGER: Primary | ICD-10-CM

## 2024-05-17 DIAGNOSIS — J01.00 ACUTE NON-RECURRENT MAXILLARY SINUSITIS: ICD-10-CM

## 2024-05-17 DIAGNOSIS — E66.3 OVERWEIGHT (BMI 25.0-29.9): ICD-10-CM

## 2024-05-17 PROCEDURE — 99213 OFFICE O/P EST LOW 20 MIN: CPT | Performed by: FAMILY MEDICINE

## 2024-05-17 RX ORDER — CETIRIZINE HYDROCHLORIDE 10 MG/1
10 TABLET ORAL DAILY
Qty: 90 TABLET | Refills: 3 | Status: SHIPPED | OUTPATIENT
Start: 2024-05-17

## 2024-05-17 RX ORDER — AMOXICILLIN 500 MG/1
500 TABLET, FILM COATED ORAL 3 TIMES DAILY
Qty: 30 TABLET | Refills: 0 | Status: SHIPPED | OUTPATIENT
Start: 2024-05-17

## 2024-05-17 RX ORDER — MONTELUKAST SODIUM 10 MG/1
10 TABLET ORAL NIGHTLY
Qty: 90 TABLET | Refills: 3 | Status: SHIPPED | OUTPATIENT
Start: 2024-05-17

## 2024-05-17 NOTE — ASSESSMENT & PLAN NOTE
Patient's (Body mass index is 27.29 kg/m².) indicates that they are overweight with health conditions that include none . Weight is unchanged. BMI is is above average; BMI management plan is completed. We discussed portion control and increasing exercise.

## 2024-10-28 ENCOUNTER — TELEPHONE (OUTPATIENT)
Dept: FAMILY MEDICINE CLINIC | Facility: CLINIC | Age: 29
End: 2024-10-28
Payer: COMMERCIAL

## 2024-10-28 DIAGNOSIS — L01.00 IMPETIGO: Primary | ICD-10-CM

## 2024-10-28 RX ORDER — CLINDAMYCIN HYDROCHLORIDE 300 MG/1
300 CAPSULE ORAL 3 TIMES DAILY
Qty: 30 CAPSULE | Refills: 0 | Status: SHIPPED | OUTPATIENT
Start: 2024-10-28

## 2024-10-28 NOTE — TELEPHONE ENCOUNTER
Patients mother has been contacted and notified that we are sending in a mediation for treatment for the places. She has been notified that if after starting treatment if he is not improving 48 hours after starting then we need to see him in the clinic . She is agreeable to this and she said that she is calling him now to let him know

## 2024-10-28 NOTE — TELEPHONE ENCOUNTER
Caller: BRANDT TRUJILLO    Relationship: Mother    Best call back number: [phone]      What was the call regarding: PATIENTS MOTHER CALLING IN STATES PATIENT HAS A FLARE UP ON BOTH LEGS WITH IMPETIGO     PATIENTS MOTHER STATS DR CHAMPION HAD GIVEN PATIENT PILLS BEFORE AND WANTED TO SEE IF SHE COULD CALL IN IN SOMETHING TODAY     [id number] - [facility] [place] - [address] SEC OF [address] [facility] [address] - [phone] [place] [phone] FX     PLEASE GIVE CALLBACK

## 2024-12-23 ENCOUNTER — TELEPHONE (OUTPATIENT)
Dept: FAMILY MEDICINE CLINIC | Facility: CLINIC | Age: 29
End: 2024-12-23
Payer: COMMERCIAL

## 2024-12-23 NOTE — TELEPHONE ENCOUNTER
Hub staff attempted to follow warm transfer process and was unsuccessful     Caller: BRANDT TRUJILLO    Relationship to patient: Mother    Best call back number:     [phone] (Home)       Patient is needing:     NEEDING TO BE SEEN ON FRIDAY FOR IMPETIGO     HE HAS NOT ESTABLISHED WITH A NEW PROVIDER YET   - AND THE SOONEST AVAILABLE IS IN MARCH WITH DR GUZMAN (PREFERRED)     CAN YOU CALL AND SCHEDULE APPT PLEASE

## 2024-12-23 NOTE — TELEPHONE ENCOUNTER
Mother cancelled appt with  12/27 due to patient having to work overtime. Patient is requesting an appt in January on a Friday due to his work schedule. Mother felt he had already established care with . Chart does not show any establish care appts with Dr. Fall. Can not schedule appt for impetigo with Dr. Ervin after the first of the year. Was informed  only does one Est New Pt appt in morning & one in afternoon. There are no Est New Pt appts until March 14. Please advise how to schedule this patient.

## 2025-01-23 NOTE — PROGRESS NOTES
Subjective   Atom EFRA Choi is a 29 y.o. male.   Chief Complaint   Patient presents with    Rash       History of Present Illness     Rash  -Rash is constant since 05/2024, multiple sites  -Rash is red, raised above the skin, itching.  Denies pustules but states that he gets tiny blisters and clear fluid will come out of the lesions after he scratches  -Treatment includes Hydrocortisone cream (OTC) but help a small amount. clindamycin, amoxicillin with no improvement  -Does not use moisturizing cream  - Work with items that causes itching and throat itching, headaches      Anxiety and depression screen  - Today   PHQ-9: 6   ZOE-7: 8  -States anxiety will sometimes get very bad, he will have trouble breathing and get dizzy.  This occurs a few times a week  - past medications: citalopram (worsening anxiety, gain weight),           Preventative  - Offered COVID, Influenza, and Prevnar 20, but pt declines    The following portions of the patient's history were reviewed and updated as appropriate: allergies, current medications, past family history, past medical history, past social history, past surgical history, and problem list.    Patient Active Problem List   Diagnosis    Allergic rhinitis    Tobacco use disorder    Anxiety    Encounter for annual physical exam    Overweight (BMI 25.0-29.9)       Current Outpatient Medications on File Prior to Visit   Medication Sig Dispense Refill    Ascorbic Acid (VITAMIN C PO) Take  by mouth.      VITAMIN D PO Take  by mouth.      [DISCONTINUED] amoxicillin (AMOXIL) 500 MG tablet Take 1 tablet by mouth 3 (Three) Times a Day. 30 tablet 0    [DISCONTINUED] cetirizine (zyrTEC) 10 MG tablet Take 1 tablet by mouth Daily. 90 tablet 3    [DISCONTINUED] clindamycin (CLEOCIN) 300 MG capsule Take 1 capsule by mouth 3 (Three) Times a Day. 30 capsule 0    [DISCONTINUED] escitalopram (LEXAPRO) 10 MG tablet Take 1 tablet by mouth Daily. (Patient not taking: Reported on 5/17/2024) 90 tablet 3     "[DISCONTINUED] montelukast (SINGULAIR) 10 MG tablet Take 1 tablet by mouth Every Night. 90 tablet 3     No current facility-administered medications on file prior to visit.     Current outpatient and discharge medications have been reconciled for the patient.  Reviewed by: Delaney Fall DO      No Known Allergies      Objective   Visit Vitals  /60 (BP Location: Right arm, Patient Position: Sitting, Cuff Size: Adult)   Pulse 92   Temp 97.8 °F (36.6 °C) (Skin)   Resp 16   Ht 182.9 cm (72\")   Wt 94.4 kg (208 lb 3.2 oz)   SpO2 94%   BMI 28.24 kg/m²       Physical Exam  HENT:      Head: Normocephalic and atraumatic.   Eyes:      Conjunctiva/sclera: Conjunctivae normal.   Skin:     Comments: - Patient has red, raised areas across his wrists, forearms, ankles and legs.  No purulence noted.  Some areas appear flat, dry, flaky and slightly erythematous.  Most lesions are raised, smooth and erythematous   Neurological:      General: No focal deficit present.      Mental Status: He is alert and oriented to person, place, and time.   Psychiatric:         Mood and Affect: Mood normal.         Behavior: Behavior normal.            Left arm         Wrists         Left leg            Diagnoses and all orders for this visit:    1. Anxiety and depression (Primary)  -    Started busPIRone (BUSPAR) 5 MG tablet; Take 1 tablet by mouth 2 (Two) Times a Day.  Dispense: 60 tablet; Refill: 1  Discussed SSRIs, side effects, that it takes 4 to 6 weeks to see max benefit of the medication, but side effects typically will improve over time as the body gets adjusted.  If the side effects are \"annoying\" to give the body some time to get adjusted but if they are life impacting to call the office to switch medication before we see the pt next.  Patient verbalized understanding    2. Rash  -     Ambulatory Referral to Dermatology: [facility] [place]  -     triamcinolone (KENALOG) 0.025 % cream; Apply 1 Application topically " to the appropriate area as directed 2 (Two) Times a Day As Needed for Rash.  Dispense: 60 g; Refill: 1  -Recommended hydrating his skin 2 times daily with Aquaphor, CeraVe or Cetaphil body cream.  Suggested [employer] working hands as well    3. Overweight with body mass index (BMI) of 28 to 28.9 in adult  BMI is >= 25 and <30. (Overweight) The following options were offered after discussion;: exercise counseling/recommendations and nutrition counseling/recommendations         Follow Up  -4 to 6 weeks for annual physical exam, anxiety and rash    Expected course, medications, and adverse effects discussed as appropriate.  Call or return if worsening or persistent symptoms. Hand hygiene was performed before donning protective equipment and after removal when leaving the room.    This document is intended for medical expert use only. Reading of this document by patients and/or patient's family without participating medical staff guidance may result in misinterpretation and unintended morbidity. Any interpretation of such data is the responsibility of the patient and/or family member responsible for the patient in concert with their primary or specialist providers, not to be left for sources of online searches such as [employer], [employer] or similar queries. Relying on these approaches to knowledge may result in misinterpretation, misguided goals of care and even death should patients or family members try recommendations outside of the realm of professional medical care.

## 2025-01-24 ENCOUNTER — OFFICE VISIT (OUTPATIENT)
Dept: FAMILY MEDICINE CLINIC | Facility: CLINIC | Age: 30
End: 2025-01-24
Payer: COMMERCIAL

## 2025-01-24 VITALS
HEART RATE: 92 BPM | BODY MASS INDEX: 28.2 KG/M2 | SYSTOLIC BLOOD PRESSURE: 116 MMHG | DIASTOLIC BLOOD PRESSURE: 60 MMHG | RESPIRATION RATE: 16 BRPM | HEIGHT: 72 IN | WEIGHT: 208.2 LBS | TEMPERATURE: 97.8 F | OXYGEN SATURATION: 94 %

## 2025-01-24 DIAGNOSIS — E66.3 OVERWEIGHT WITH BODY MASS INDEX (BMI) OF 28 TO 28.9 IN ADULT: ICD-10-CM

## 2025-01-24 DIAGNOSIS — F41.9 ANXIETY AND DEPRESSION: Primary | ICD-10-CM

## 2025-01-24 DIAGNOSIS — F32.A ANXIETY AND DEPRESSION: Primary | ICD-10-CM

## 2025-01-24 DIAGNOSIS — R21 RASH: ICD-10-CM

## 2025-01-24 PROCEDURE — 99214 OFFICE O/P EST MOD 30 MIN: CPT | Performed by: STUDENT IN AN ORGANIZED HEALTH CARE EDUCATION/TRAINING PROGRAM

## 2025-01-24 RX ORDER — BUSPIRONE HYDROCHLORIDE 5 MG/1
5 TABLET ORAL 2 TIMES DAILY
Qty: 60 TABLET | Refills: 1 | Status: SHIPPED | OUTPATIENT
Start: 2025-01-24

## 2025-01-24 RX ORDER — TRIAMCINOLONE ACETONIDE 0.25 MG/G
1 CREAM TOPICAL 2 TIMES DAILY PRN
Qty: 60 G | Refills: 1 | Status: SHIPPED | OUTPATIENT
Start: 2025-01-24